# Patient Record
Sex: FEMALE | Race: WHITE
[De-identification: names, ages, dates, MRNs, and addresses within clinical notes are randomized per-mention and may not be internally consistent; named-entity substitution may affect disease eponyms.]

---

## 2020-05-25 ENCOUNTER — HOSPITAL ENCOUNTER (INPATIENT)
Dept: HOSPITAL 90 - 4CH | Age: 70
LOS: 2 days | Discharge: HOME | DRG: 246 | End: 2020-05-27
Attending: INTERNAL MEDICINE | Admitting: INTERNAL MEDICINE
Payer: MEDICARE

## 2020-05-25 VITALS — HEIGHT: 62 IN | WEIGHT: 131.2 LBS | BODY MASS INDEX: 24.14 KG/M2

## 2020-05-25 DIAGNOSIS — F17.210: ICD-10-CM

## 2020-05-25 DIAGNOSIS — Z90.710: ICD-10-CM

## 2020-05-25 DIAGNOSIS — I48.91: ICD-10-CM

## 2020-05-25 DIAGNOSIS — I50.33: ICD-10-CM

## 2020-05-25 DIAGNOSIS — Z82.49: ICD-10-CM

## 2020-05-25 DIAGNOSIS — R94.31: ICD-10-CM

## 2020-05-25 DIAGNOSIS — I25.110: Primary | ICD-10-CM

## 2020-05-25 DIAGNOSIS — I11.0: ICD-10-CM

## 2020-05-25 DIAGNOSIS — H40.9: ICD-10-CM

## 2020-05-25 DIAGNOSIS — E78.5: ICD-10-CM

## 2020-05-25 DIAGNOSIS — H26.9: ICD-10-CM

## 2020-05-25 DIAGNOSIS — J44.9: ICD-10-CM

## 2020-05-25 DIAGNOSIS — R79.89: ICD-10-CM

## 2020-05-25 DIAGNOSIS — I65.29: ICD-10-CM

## 2020-05-25 DIAGNOSIS — N39.0: ICD-10-CM

## 2020-05-25 PROCEDURE — 84484 ASSAY OF TROPONIN QUANT: CPT

## 2020-05-25 PROCEDURE — 85610 PROTHROMBIN TIME: CPT

## 2020-05-25 PROCEDURE — 99156 MOD SED OTH PHYS/QHP 5/>YRS: CPT

## 2020-05-25 PROCEDURE — 85730 THROMBOPLASTIN TIME PARTIAL: CPT

## 2020-05-25 PROCEDURE — 85025 COMPLETE CBC W/AUTO DIFF WBC: CPT

## 2020-05-25 PROCEDURE — 83874 ASSAY OF MYOGLOBIN: CPT

## 2020-05-25 PROCEDURE — 93005 ELECTROCARDIOGRAM TRACING: CPT

## 2020-05-25 PROCEDURE — 36415 COLL VENOUS BLD VENIPUNCTURE: CPT

## 2020-05-25 PROCEDURE — 80053 COMPREHEN METABOLIC PANEL: CPT

## 2020-05-25 PROCEDURE — 93458 L HRT ARTERY/VENTRICLE ANGIO: CPT

## 2020-05-25 PROCEDURE — 80061 LIPID PANEL: CPT

## 2020-05-25 PROCEDURE — 80048 BASIC METABOLIC PNL TOTAL CA: CPT

## 2020-05-25 PROCEDURE — 99157 MOD SED OTHER PHYS/QHP EA: CPT

## 2020-05-25 PROCEDURE — 82550 ASSAY OF CK (CPK): CPT

## 2020-05-26 VITALS — DIASTOLIC BLOOD PRESSURE: 59 MMHG | SYSTOLIC BLOOD PRESSURE: 86 MMHG

## 2020-05-26 VITALS — DIASTOLIC BLOOD PRESSURE: 48 MMHG | SYSTOLIC BLOOD PRESSURE: 117 MMHG

## 2020-05-26 VITALS — SYSTOLIC BLOOD PRESSURE: 100 MMHG | DIASTOLIC BLOOD PRESSURE: 64 MMHG

## 2020-05-26 VITALS — SYSTOLIC BLOOD PRESSURE: 97 MMHG | DIASTOLIC BLOOD PRESSURE: 58 MMHG

## 2020-05-26 VITALS — DIASTOLIC BLOOD PRESSURE: 95 MMHG | SYSTOLIC BLOOD PRESSURE: 195 MMHG

## 2020-05-26 VITALS — DIASTOLIC BLOOD PRESSURE: 64 MMHG | SYSTOLIC BLOOD PRESSURE: 104 MMHG

## 2020-05-26 VITALS — SYSTOLIC BLOOD PRESSURE: 148 MMHG | DIASTOLIC BLOOD PRESSURE: 63 MMHG

## 2020-05-26 VITALS — DIASTOLIC BLOOD PRESSURE: 63 MMHG | SYSTOLIC BLOOD PRESSURE: 94 MMHG

## 2020-05-26 VITALS — DIASTOLIC BLOOD PRESSURE: 73 MMHG | SYSTOLIC BLOOD PRESSURE: 123 MMHG

## 2020-05-26 VITALS — SYSTOLIC BLOOD PRESSURE: 107 MMHG | DIASTOLIC BLOOD PRESSURE: 67 MMHG

## 2020-05-26 VITALS — SYSTOLIC BLOOD PRESSURE: 105 MMHG | DIASTOLIC BLOOD PRESSURE: 63 MMHG

## 2020-05-26 VITALS — SYSTOLIC BLOOD PRESSURE: 110 MMHG | DIASTOLIC BLOOD PRESSURE: 74 MMHG

## 2020-05-26 LAB
ALBUMIN SERPL-MCNC: 3.4 G/DL (ref 3.5–5)
ALT SERPL-CCNC: 11 U/L (ref 12–78)
APTT PPP: 29.7 SEC (ref 26.3–35.5)
AST SERPL-CCNC: 19 U/L (ref 10–37)
BILIRUB SERPL-MCNC: 0.5 MG/DL (ref 0.2–1)
BUN SERPL-MCNC: 11 MG/DL (ref 7–18)
CHLORIDE SERPL-SCNC: 106 MMOL/L (ref 101–111)
CHOLEST SERPL-MCNC: 109 MG/DL (ref ?–200)
CK SERPL-CCNC: 46 U/L (ref 21–232)
CO2 SERPL-SCNC: 29 MMOL/L (ref 21–32)
CREAT SERPL-MCNC: 0.9 MG/DL (ref 0.5–1.5)
GFR SERPL CREATININE-BSD FRML MDRD: 66 ML/MIN (ref 60–?)
GLUCOSE SERPL-MCNC: 102 MG/DL (ref 70–105)
HDLC SERPL-MCNC: 38 MG/DL (ref 35–85)
INR PPP: 0.95 (ref 0.85–1.15)
LDLC SERPL CALC-MCNC: 57 MG/DL (ref 0–99)
MYOGLOBIN SERPL-MCNC: 40 NG/ML (ref 10–92)
POTASSIUM SERPL-SCNC: 3.6 MMOL/L (ref 3.5–5.1)
PROT SERPL-MCNC: 6.5 G/DL (ref 6–8.3)
PROTHROMBIN TIME: 10.3 SEC (ref 9.6–11.6)
SODIUM SERPL-SCNC: 142 MMOL/L (ref 136–145)
TRIGL SERPL-MCNC: 117 MG/DL (ref 30–200)
TROPONIN I SERPL-MCNC: 0.06 NG/ML (ref 0–0.06)

## 2020-05-26 PROCEDURE — 4A023N7 MEASUREMENT OF CARDIAC SAMPLING AND PRESSURE, LEFT HEART, PERCUTANEOUS APPROACH: ICD-10-PCS | Performed by: INTERNAL MEDICINE

## 2020-05-26 PROCEDURE — 027034Z DILATION OF CORONARY ARTERY, ONE ARTERY WITH DRUG-ELUTING INTRALUMINAL DEVICE, PERCUTANEOUS APPROACH: ICD-10-PCS | Performed by: INTERNAL MEDICINE

## 2020-05-26 PROCEDURE — B2111ZZ FLUOROSCOPY OF MULTIPLE CORONARY ARTERIES USING LOW OSMOLAR CONTRAST: ICD-10-PCS | Performed by: INTERNAL MEDICINE

## 2020-05-26 PROCEDURE — B2151ZZ FLUOROSCOPY OF LEFT HEART USING LOW OSMOLAR CONTRAST: ICD-10-PCS | Performed by: INTERNAL MEDICINE

## 2020-05-26 RX ADMIN — METOPROLOL TARTRATE SCH MG: 50 TABLET, FILM COATED ORAL at 20:55

## 2020-05-26 RX ADMIN — AMLODIPINE BESYLATE SCH MG: 5 TABLET ORAL at 10:30

## 2020-05-26 RX ADMIN — TICAGRELOR SCH MG: 90 TABLET ORAL at 20:55

## 2020-05-26 RX ADMIN — FAMOTIDINE SCH MG: 10 INJECTION INTRAVENOUS at 20:55

## 2020-05-26 RX ADMIN — FAMOTIDINE SCH MG: 10 INJECTION INTRAVENOUS at 12:11

## 2020-05-26 NOTE — NUR
CM NOTE/IA

MEET WITH PATIENT IN ROOM. AS PER PATIENT, LIVES ALONE, HAS DAUGHTER CLAIRE ASSIST HER AT 
TIMES WHEN NEEDED BUT CURRENTLY DAUGHTER IS OUT OF TOWN AND PATIENT GETS HELP FROM [facility], 
CHRIS MCINTYRE IF NEEDED. ALSO, NO DME IN USE, INDEPENDENT WITH ADLS, NO HOME HEALTH OR 
PROVIDERS IN USE AND FEELS SAFE TO RETURN HOME. 

-------------------------------------------------------------------------------

Addendum: 05/27/20 at 3418 by AUSTIN TRIANA RN CM

-------------------------------------------------------------------------------

Amended: Links added.

## 2020-05-27 VITALS — SYSTOLIC BLOOD PRESSURE: 113 MMHG | DIASTOLIC BLOOD PRESSURE: 73 MMHG

## 2020-05-27 VITALS — SYSTOLIC BLOOD PRESSURE: 104 MMHG | DIASTOLIC BLOOD PRESSURE: 59 MMHG

## 2020-05-27 LAB
BASOPHILS NFR BLD AUTO: 0.6 % (ref 0–5)
BUN SERPL-MCNC: 11 MG/DL (ref 7–18)
CHLORIDE SERPL-SCNC: 107 MMOL/L (ref 101–111)
CO2 SERPL-SCNC: 26 MMOL/L (ref 21–32)
CREAT SERPL-MCNC: 0.7 MG/DL (ref 0.5–1.5)
EOSINOPHIL NFR BLD AUTO: 1.5 % (ref 0–8)
ERYTHROCYTE [DISTWIDTH] IN BLOOD BY AUTOMATED COUNT: 12.7 % (ref 11–15.5)
GFR SERPL CREATININE-BSD FRML MDRD: 88 ML/MIN (ref 60–?)
GLUCOSE SERPL-MCNC: 97 MG/DL (ref 70–105)
HCT VFR BLD AUTO: 37.7 % (ref 36–48)
LYMPHOCYTES NFR SPEC AUTO: 15 % (ref 21–51)
MCH RBC QN AUTO: 31.1 PG (ref 27–33)
MCHC RBC AUTO-ENTMCNC: 33.7 G/DL (ref 32–36)
MCV RBC AUTO: 92.2 FL (ref 79–99)
MONOCYTES NFR BLD AUTO: 7.6 % (ref 3–13)
NEUTROPHILS NFR BLD AUTO: 75.2 % (ref 40–77)
NRBC BLD MANUAL-RTO: 0 % (ref 0–0.19)
PLATELET # BLD AUTO: 159 K/UL (ref 130–400)
POTASSIUM SERPL-SCNC: 3.5 MMOL/L (ref 3.5–5.1)
RBC # BLD AUTO: 4.09 MIL/UL (ref 4–5.5)
SODIUM SERPL-SCNC: 140 MMOL/L (ref 136–145)
WBC # BLD AUTO: 7.2 K/UL (ref 4.8–10.8)

## 2020-05-27 RX ADMIN — TICAGRELOR SCH MG: 90 TABLET ORAL at 08:43

## 2020-05-27 RX ADMIN — FAMOTIDINE SCH MG: 10 INJECTION INTRAVENOUS at 08:45

## 2020-05-27 RX ADMIN — METOPROLOL TARTRATE SCH MG: 50 TABLET, FILM COATED ORAL at 08:43

## 2020-05-27 RX ADMIN — AMLODIPINE BESYLATE SCH MG: 5 TABLET ORAL at 08:44

## 2020-05-27 NOTE — NUR
DISCHARGE SUMMARY REVIEW   FOLLOW WITH CARDIAC   APPT.         AND MEDICATIONS .  TO TAKE , 
AND NOT TO STOP.  HER [facility] 

SL  TO HER RFA,  REMOVED AND NOTED NO REDNESS OR HEMATOMA.  SM PRESSURE DRSG PLACED RECHECK 
.   HER RT GROIN , DRSG DRY AND CLEAN .  DENIES ANY CHEST PAIN

## 2020-05-27 NOTE — NUR
PT AAO X 3 REVIEW PLAN OFCARE.   ASSESS PT RT GROIN ,CLEAR DRSG DRY AND CLEAN WITH NOTED NO 
HEMATOMA .   PULSES TO HER FEET . STRONG ,CMS PRESENT .TO TOES DENIES ANY CHEST PAIN,   CALL 
LIGHT IN REACH

## 2023-02-01 ENCOUNTER — HOSPITAL ENCOUNTER (OUTPATIENT)
Dept: HOSPITAL 90 - SHCH | Age: 73
Discharge: HOME | End: 2023-02-01
Attending: INTERNAL MEDICINE
Payer: COMMERCIAL

## 2023-02-01 DIAGNOSIS — I65.23: Primary | ICD-10-CM

## 2023-02-01 PROCEDURE — 93880 EXTRACRANIAL BILAT STUDY: CPT

## 2023-02-01 PROCEDURE — 93978 VASCULAR STUDY: CPT
